# Patient Record
Sex: FEMALE | Race: WHITE | Employment: FULL TIME | URBAN - METROPOLITAN AREA
[De-identification: names, ages, dates, MRNs, and addresses within clinical notes are randomized per-mention and may not be internally consistent; named-entity substitution may affect disease eponyms.]

---

## 2022-01-21 ENCOUNTER — OFFICE VISIT (OUTPATIENT)
Dept: SURGERY | Age: 67
End: 2022-01-21
Payer: COMMERCIAL

## 2022-01-21 VITALS
SYSTOLIC BLOOD PRESSURE: 110 MMHG | WEIGHT: 232 LBS | BODY MASS INDEX: 41.11 KG/M2 | DIASTOLIC BLOOD PRESSURE: 76 MMHG | HEIGHT: 63 IN

## 2022-01-21 DIAGNOSIS — K43.9 VENTRAL HERNIA WITHOUT OBSTRUCTION OR GANGRENE: Primary | ICD-10-CM

## 2022-01-21 PROCEDURE — 99203 OFFICE O/P NEW LOW 30 MIN: CPT | Performed by: SURGERY

## 2022-01-21 RX ORDER — HYDROCHLOROTHIAZIDE 25 MG/1
25 TABLET ORAL DAILY
COMMUNITY

## 2022-01-21 RX ORDER — LEVOTHYROXINE SODIUM 0.12 MG/1
125 TABLET ORAL DAILY
COMMUNITY

## 2022-01-21 RX ORDER — IBUPROFEN 600 MG/1
600 TABLET ORAL EVERY 6 HOURS PRN
COMMUNITY

## 2022-01-21 ASSESSMENT — ENCOUNTER SYMPTOMS
ALLERGIC/IMMUNOLOGIC NEGATIVE: 1
EYES NEGATIVE: 1
RESPIRATORY NEGATIVE: 1
ABDOMINAL PAIN: 1

## 2022-01-21 NOTE — PROGRESS NOTES
Harjit Blue is a 77 y.o. female     CC: Bulge of the abdominal wall    HPI: 78-year-old female who presents for evaluation with bulge of the abdominal wall. She reports some mild symptoms of discomfort in the area of the bulge. The discomfort occurs with physical exertion and is better with rest.  No history of nausea, vomiting, anorexia, unexpected weight loss, fevers, chills, change in bowel habits or urinary symptoms. CAT scan from [facility] on 10/29/2021 showed a small fat-containing left inguinal hernia as well as a fat-containing midline upper abdominal wall hernia. No past medical history on file. Hydromorphone     No past surgical history on file. Prior to Visit Medications    Medication Sig Taking?  Authorizing Provider   hydroCHLOROthiazide (HYDRODIURIL) 25 MG tablet Take 25 mg by mouth daily Yes Historical Provider, MD   levothyroxine (SYNTHROID) 125 MCG tablet Take 125 mcg by mouth Daily Yes Historical Provider, MD   ibuprofen (ADVIL;MOTRIN) 600 MG tablet Take 600 mg by mouth every 6 hours as needed for Pain Yes Historical Provider, MD   Misc Natural Products (ELDERBERRY ZINC/VIT C/IMMUNE MT) Take by mouth Yes Historical Provider, MD   Multiple Vitamin (MULTIVITAMIN ADULT PO) Take by mouth Yes Historical Provider, MD       Social History     Socioeconomic History    Marital status: Not on file     Spouse name: Not on file    Number of children: Not on file    Years of education: Not on file    Highest education level: Not on file   Occupational History    Not on file   Tobacco Use    Smoking status: Never Smoker    Smokeless tobacco: Never Used   Substance and Sexual Activity    Alcohol use: Never    Drug use: Never    Sexual activity: Not on file   Other Topics Concern    Not on file   Social History Narrative    Not on file     Social Determinants of Health     Financial Resource Strain:     Difficulty of Paying Living Expenses: Not on file   Food Insecurity:     sounds: Normal breath sounds. Abdominal:      General: There is no distension. Palpations: Abdomen is soft. Tenderness: There is no abdominal tenderness. Comments: Hernia appreciated about 5 cm above the umbilicus   Musculoskeletal:         General: Normal range of motion. Cervical back: Normal range of motion and neck supple. Skin:     General: Skin is warm and dry. Neurological:      Mental Status: She is alert and oriented to person, place, and time. Psychiatric:         Behavior: Behavior normal.       /76   Wt 232 lb (105.2 kg)       :      Pleasant 51-year-old female who presents for evaluation of a bulge of the abdominal wall. She reports some symptoms of localized discomfort in the area of the bulge. On physical examination, there is a hernia appreciated about 5 cm above the umbilicus. By CAT scan in October 2021, the hernia is fat-containing. This correlates with clinical findings. Her current BMI is 41.10 kg/m². We discussed immediate hernia repair versus delayed repair after weight loss. She understands that there is a higher recurrence risk if we proceed with surgery at her current weight. Plan:      No plans for surgery at this time. The patient will attempt weight loss on her own. She was given information regarding the medical and surgical weight loss programs at [facility] as well. The tentative plan will be for her to follow-up with me in approximately 3 months.

## 2022-01-21 NOTE — LETTER
[facility]  [address] [address]  Phone: [phone]  Fax: [phone]    February 10, 2022    Patient: Valentina Delong  MRN:  <[id number]>  YOB: 1955  Date of Visit: 1/21/2022    Dear Dr Sid Sicard:    Thank you for the request for consultation for Valentina Delong. Below are the relevant portions of my assessment and plan of care. Assessment:  Pleasant 61-year-old female who presents for evaluation of a bulge of the abdominal wall. She reports some symptoms of localized discomfort in the area of the bulge. On physical examination, there is a hernia appreciated about 5 cm above the umbilicus. By CAT scan in October 2021, the hernia is fat-containing. This correlates with clinical findings. Her current BMI is 41.10 kg/m². We discussed immediate hernia repair versus delayed repair after weight loss. She understands that there is a higher recurrence risk if we proceed with surgery at her current weight. Plan:  No plans for surgery at this time. The patient will attempt weight loss on her own. She was given information regarding the medical and surgical weight loss programs at [facility] as well. The tentative plan will be for her to follow-up with me in approximately 3 months. If you have questions, please do not hesitate to call me. I look forward to following Ole Yuly along with you. Sincerely,    Jacquelin Nascimento MD     providers:    Magaly Cunha MD  [address] [facility].   [address]  Via Fax: [phone]

## 2025-06-19 ENCOUNTER — TELEPHONE (OUTPATIENT)
Dept: CARDIOLOGY CLINIC | Age: 70
End: 2025-06-19

## 2025-06-19 NOTE — TELEPHONE ENCOUNTER
Pt requesting a new pt appt      Referring provider or self referral:  Self Referring - Requesting appt with [facility] as her brother, Sheldon Ivory sees her.      Reason for referral:  Blood test results showed she was high risk for heart attack. Currently she is feeling fatigue and ankle swelling.      Previous cardiologist:  No      Previous testing such as echo, monitor, EKG...etc  Thyroid Blood Work - She did a 3 day online thyroid seminar in which she was offered a $3000 lab test at minimal cost. She will bring lab work done at [facility] to appt with her.      Please request your previous provider to fax records to [phone]